# Patient Record
Sex: MALE | Race: BLACK OR AFRICAN AMERICAN
[De-identification: names, ages, dates, MRNs, and addresses within clinical notes are randomized per-mention and may not be internally consistent; named-entity substitution may affect disease eponyms.]

---

## 2018-01-16 ENCOUNTER — HOSPITAL ENCOUNTER (EMERGENCY)
Dept: HOSPITAL 41 - JD.ED | Age: 23
Discharge: HOME | End: 2018-01-16
Payer: COMMERCIAL
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
## 2018-01-16 DIAGNOSIS — S62.355A: Primary | ICD-10-CM
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
## 2018-01-16 NOTE — EDM.PDOC
ED HPI GENERAL MEDICAL PROBLEM





- General


Chief Complaint: Upper Extremity Injury/Pain


Stated Complaint: L HAND SWOLLEN


Time Seen by Provider: 01/16/18 18:42





- History of Present Illness


Treatments PTA: Reports: NSAIDS


  ** Left Hand


Pain Score (Numeric/FACES): 4





- Related Data


 Allergies











Allergy/AdvReac Type Severity Reaction Status Date / Time


 


No Known Allergies Allergy   Verified 01/16/18 18:06











Home Meds: 


 Home Meds





. [No Known Home Meds]  01/16/18 [History]











Past Medical History





- Past Health History


Medical/Surgical History: Denies Medical/Surgical History





Social & Family History





- Tobacco Use


Smoking Status *Q: Never Smoker


Second Hand Smoke Exposure: No





- Caffeine Use


Caffeine Use: Reports: None





- Recreational Drug Use


Recreational Drug Use: No





Review of Systems





- Review of Systems


Review Of Systems: See Below


Constitutional: Reports: No Symptoms


Respiratory: Reports: No Symptoms


Cardiovascular: Reports: No Symptoms


Musculoskeletal: Reports: Hand Pain (Left hand pain following injury playing 

basketball last Thursday. He states he has been icing hand, and working with 

. He states he is unsure specifically what caused injury. He 

has pain with certain  and positions of hand. Has mild swelling, no 

bruising, no obvious deformity. He is right hand dominant. Pain is mostly 

isolated to left medial aspect of hand, distal ulnar aspect of left wrist. does 

not have pain with movement of left wrist. )


Skin: Reports: No Symptoms


Neurological: Reports: No Symptoms


Psychiatric: Reports: No Symptoms





ED EXAM, GENERAL





- Physical Exam


Exam: See Below


Exam Limited By: No Limitations


General Appearance: Alert, WD/WN, No Apparent Distress


Respiratory/Chest: Lungs Clear, Normal Breath Sounds


Cardiovascular: Regular Rate, Rhythm, No Murmur


Extremities: Other (Left hand, mild swelling to dorsal aspect, mid 4th/5th 

metacarpal bones. has no tenderness to palpation along dorsal aspect of left 

hand. Palmar aspect, mid hand, approxiamtely 4th/5th metacarpals is tender to 

touch. Has normal ROM to all digits, including thumb. Has no pain to palpation 

of left proximal hand or wrist. Normal ROM to left wrist. No bruising, no 

obvious deformity. Normal capillary refill, NV intact. )


Neurological: Alert, Oriented, CN II-XII Intact





ED TRAUMA EXTREMITY PROCEDURES





- Splinting


  ** Left Upper Extremity


Pre-Procedure NV Status: Normal


Post-Procedure NV Status: Normal


Splint Material: Plaster


Splint Design: Volar


Applied & Form Fitted By: Provider


Provider Post-Splint Application NV Check: NV Status Normal, Good Position


Complications: No





Course





- Vital Signs


Text/Narrative:: 





1/16/18 1950


Review of left hand xray does reveal a non-displaced fracture to 4th mid MCP. 

Formal radiology report pending. Patient notified of findings, and plan to 

place in a splint and have patient follow-up outpatient with orthopedics. 





1/16/18 2012


volar splint placed to left hand/wrist. Patient tolerated procedure well. 

examination of hand/digits following splint reveals NV intact, normal sensation

, normal capillary refill. normal ROM to fingers. 


Patient will be discharged home with plans to followup with orthopedics for 

further evaluation/management. 


Last Recorded V/S: 


 Last Vital Signs











Temp  98.7 F   01/16/18 18:06


 


Pulse  68   01/16/18 18:06


 


Resp  18   01/16/18 18:06


 


BP  131/88   01/16/18 18:06


 


Pulse Ox  100   01/16/18 18:06














Departure





- Departure


Time of Disposition: 19:45


Disposition: Home, Self-Care 01


Condition: Good


Clinical Impression: 


 Closed fracture of 4th metacarpal, Fracture of metacarpal bone, Fracture of 

hand








- Discharge Information


Instructions:  Cast or Splint Care, Easy-to-Read


Referrals: 


PCP,Not In Area [Primary Care Provider] - 


Flex Johnson MD [Physician] - 


Forms:  ED Department Discharge


Additional Instructions: 


Recommend calling [facility] in the morning to schedule a follow-up 

appointment with Dr Johnson. Phone number is [phone]. 


Monitor for any swelling, worsening pain, numbness/tingling to fingers, ect and 

return to ED as needed with any concerns. 


Keep left forearm elevated, treat any discomfort with over the counter tylenol 

or motrin, as directed.

## 2018-01-17 NOTE — CR
Left hand:  Four views of the left hand were obtained.

 

Comparison: No previous study.

 

Oblique nondisplaced fracture is seen within the mid to proximal shaft

 of the fourth metacarpal.  Soft tissue swelling is seen.  No 

additional bony abnormality is identified.

 

Impression:

1.  Nondisplaced fourth metacarpal fracture.

2.  Soft tissue swelling.

 

Diagnostic code #3